# Patient Record
Sex: MALE | NOT HISPANIC OR LATINO | URBAN - METROPOLITAN AREA
[De-identification: names, ages, dates, MRNs, and addresses within clinical notes are randomized per-mention and may not be internally consistent; named-entity substitution may affect disease eponyms.]

---

## 2017-01-27 ENCOUNTER — IMPORTED ENCOUNTER (OUTPATIENT)
Dept: URBAN - METROPOLITAN AREA CLINIC 1 | Facility: CLINIC | Age: 62
End: 2017-01-27

## 2017-01-27 PROBLEM — H52.4: Noted: 2017-01-27

## 2017-01-27 PROBLEM — H52.13: Noted: 2017-01-27

## 2017-01-27 PROCEDURE — S0620 ROUTINE OPHTHALMOLOGICAL EXA: HCPCS

## 2017-01-27 NOTE — PATIENT DISCUSSION
1. Myopia: Rx was given for correction if indicated and requested. 2. Presbyopia 3. Dry Eye w/ PEK OU - Recommend ATs BID OU PRN (sample of Systane Ultra given) 4. Cataract OU - Observe 5. H/o LASIK OUReturn for an appointment in 1 year for 36. with Dr. Jackie Villanueva.

## 2019-10-17 ENCOUNTER — IMPORTED ENCOUNTER (OUTPATIENT)
Dept: URBAN - METROPOLITAN AREA CLINIC 1 | Facility: CLINIC | Age: 64
End: 2019-10-17

## 2019-10-17 PROBLEM — H04.123: Noted: 2019-10-17

## 2019-10-17 PROBLEM — H16.143: Noted: 2019-10-17

## 2019-10-17 PROBLEM — H25.813: Noted: 2019-10-17

## 2019-10-17 PROBLEM — H35.712: Noted: 2019-10-17

## 2019-10-17 PROCEDURE — 92134 CPTRZ OPH DX IMG PST SGM RTA: CPT

## 2019-10-17 PROCEDURE — 92014 COMPRE OPH EXAM EST PT 1/>: CPT

## 2019-10-17 NOTE — PATIENT DISCUSSION
1.   OS -- MAC OCT ordered & done today OU: OD- WNL / OS- Increase Thickening. Will refer patient to see a Retinal Specialist tomorrow for care & management / treatment. 2.  Cataracts OU -- Observe for now without intervention. The patient was advised to contact us if any change or worsening of vision. 3. DEO w/ PEK OU -- Recommend the frequent use of OTC AT's BID-QID OU Routinely. 4. S/p LASIK OU  Return for an appointment in 6 MO for a Sudhakar / Rashida Badillo / Raman / [address] OU with Dr. Thang Hylton. Per [facility] do not release Glasses MRx at this time secondary to unresolved Retina Issues (will repeat MRx in 6 months).

## 2022-04-02 ASSESSMENT — VISUAL ACUITY
OD_GLARE: 20/60
OS_CC: J16
OS_SC: J3
OS_CC: 20/20
OD_CC: 20/20-1
OD_CC: J1
OS_CC: 20/100
OD_CC: 20/20
OD_SC: J5

## 2022-04-02 ASSESSMENT — TONOMETRY
OD_IOP_MMHG: 13
OS_IOP_MMHG: 12
OS_IOP_MMHG: 14
OD_IOP_MMHG: 14